# Patient Record
Sex: MALE | Race: WHITE
[De-identification: names, ages, dates, MRNs, and addresses within clinical notes are randomized per-mention and may not be internally consistent; named-entity substitution may affect disease eponyms.]

---

## 2018-07-11 ENCOUNTER — HOSPITAL ENCOUNTER (INPATIENT)
Dept: HOSPITAL 74 - YASAS | Age: 33
LOS: 3 days | Discharge: LEFT BEFORE DISCHARGE | DRG: 770 | End: 2018-07-14
Attending: SURGERY | Admitting: SURGERY
Payer: COMMERCIAL

## 2018-07-11 VITALS — BODY MASS INDEX: 24.7 KG/M2

## 2018-07-11 DIAGNOSIS — F10.230: ICD-10-CM

## 2018-07-11 DIAGNOSIS — F17.213: ICD-10-CM

## 2018-07-11 DIAGNOSIS — F14.20: ICD-10-CM

## 2018-07-11 DIAGNOSIS — F12.20: ICD-10-CM

## 2018-07-11 DIAGNOSIS — E11.9: ICD-10-CM

## 2018-07-11 DIAGNOSIS — F19.24: ICD-10-CM

## 2018-07-11 DIAGNOSIS — F32.9: ICD-10-CM

## 2018-07-11 DIAGNOSIS — F13.230: ICD-10-CM

## 2018-07-11 DIAGNOSIS — F11.20: Primary | ICD-10-CM

## 2018-07-11 DIAGNOSIS — Z79.84: ICD-10-CM

## 2018-07-11 PROCEDURE — HZ2ZZZZ DETOXIFICATION SERVICES FOR SUBSTANCE ABUSE TREATMENT: ICD-10-PCS | Performed by: SURGERY

## 2018-07-11 RX ADMIN — Medication SCH: at 23:41

## 2018-07-11 NOTE — HP
CIWA Score





- CIWA Score


Nausea/Vomitin-No Nausea/No Vomiting


Muscle Tremors: 1-None Visible, but Felt


Anxiety: 3


Agitation: 3


Paroxysmal Sweats: 2


Orientation: 0-Oriented


Tacttile Disturbances: 0-None


Auditory Disturbances: 0-None


Visual Disturbances: 0-None


Headache: 3-Moderate


CIWA-Ar Total Score: 12





Admission ROS S





- HPI


Chief Complaint: 





alcohol withdrawal symptoms 


Allergies/Adverse Reactions: 


 Allergies











Allergy/AdvReac Type Severity Reaction Status Date / Time


 


No Known Allergies Allergy   Unverified 18 20:44











History of Present Illness: 





32 yo male with hx of nicotine, marijuana, IV heroin, xanax, fentanyl, cocaine 

dependence, is here seeking detox for the first time. Reports no prior 

admissions any  detox facility. [facility] tx for facial fx and opioid 

overdoes on 18. Reports OD x3. MMTP: [facility], on methadone 30 mg

, last medicated today. PMHX: right hip replacement, DMII ( Oral meds), 

depression. Denies hx of seizures or blackouts. Longest period of sobriety 2 

years. 


. 


Exam Limitations: No Limitations





- Ebola screening


Have you been sick,other than usual withdrawal symptoms: No





- Review of Systems


Constitutional: Chills, Diaphoresis, Loss of Appetite, Changes in sleep (

reports no sleep x 7 days), Weakness, Unintentional Wgt. Loss (20 lbs over a 

month)


EENT: reports: Blurred Vision, Other (hx nose fx on 18)


Respiratory: reports: SOB with Exertion


Cardiac: reports: Lightheadedness


GI: reports: No Symptoms Reported


: reports: No Symptoms Reported


Musculoskeletal: reports: See HPI


Integumentary: reports: No Symptoms Reported


Neuro: reports: Headache, Weakness


Endocrine: reports: No Symptoms Reported


Hematology: reports: No Symptoms Reported


Psychiatric: reports: Orientated x3, Anxious


Other Systems: Reviewed and Negative





Patient History





- Patient Medical History


Hx Anemia: No


Hx Asthma: No


Hx Chronic Obstructive Pulmonary Disease (COPD): No


Hx Cardiac Disorders: No


Hx Congestive Heart Failure: No


Hx Hypertension: No


Hx Hypercholesterolemia: No


Hx Pacemaker: No


HX Cerebrovascular Accident: No


Hx Seizures: No


Hx Dementia: No


Hx Diabetes: Yes (on Metformin)


Hx Gastrointestinal Disorders: No


Hx Liver Disease: No


Hx Genitourinary Disorders: No


Hx Sexually Transmitted Disorders: No


Hx Renal Disease (ESRD): No


Hx Thyroid Disease: No


Hx Human Immunodeficiency Virus (HIV): No


Hx Hepatitis C: No


Hx Depression: Yes


Hx Suicide Attempt: No


Hx Bipolar Disorder: No


Hx Schizophrenia: No





- Patient Surgical History


Past Surgical History: Yes


Hx Neurologic Surgery: No


Hx Cataract Extraction: No


Hx Cardiac Surgery: No


Hx Lung Surgery: No


Hx Breast Surgery: No


Hx Breast Biopsy: No


Hx Abdominal Surgery: No


Hx Appendectomy: No


Hx Cholecystectomy: No


Hx Genitourinary Surgery: No


Hx Orthopedic Surgery: Yes (Right Hip Replacement )


Anesthesia Reaction: No





- PPD History


Previous Implant?: No


Documented Results: Negative w/o proof


PPD to be Administered?: Yes





- Smoking Cessation


Smoking history: Current every day smoker


Have you smoked in the past 12 months: Yes


Aproximately how many cigarettes per day: 4


Hx Chewing Tobacco Use: No


Initiated information on smoking cessation: Yes


'Breaking Loose' booklet given: 18





- Substance & Tx. History


Hx Alcohol Use: Yes


Substance Use Type: Alcohol, Cocaine, Heroin, Marijuana, Opiates, Tranquilizers


Hx Substance Use Treatment: No





- Substances Abused


  ** Heroin


Route: SNIFF


Frequency: Daily


Amount used: 9 BAGS


Age of first use: 29


Date of Last Use: 18





  ** Alprazolam (Xanax)


Route: Oral


Frequency: Daily


Amount used: 4/2MG


Age of first use: 27


Date of Last Use: 18





  ** Alcohol


Route: Oral


Frequency: Daily


Amount used: 2 pints E&J, Vodka


Age of first use: 18


Date of Last Use: 07/10/18





Family Disease History





- Family Disease History


Family Disease History: Other: Father (alive, heroin dep ), Mother (alive, 

heroin dep )





Admission Physical Exam [facility]





- Vital Signs


Vital Signs: 


 Vital Signs - 24 hr











  18





  17:24


 


Temperature 97.7 F


 


Pulse Rate 66


 


Respiratory 18





Rate 


 


Blood Pressure 133/91














- Physical


General Appearance: Yes: Disheveled, Mild Distress, Thin, Anxious


HEENTM: Yes: EOMI, Hearing grossly Normal, Normal ENT Inspection, Normocephalic

, Normal Voice, GENARO, Pharynx Normal, Tm's normal


Respiratory: Yes: Chest Non-Tender, Lungs Clear, Normal Breath Sounds, No 

Respiratory Distress, No Accessory Muscle Use


Neck: Yes: Within Normal Limits


Breast: Yes: Breast Exam Deferred


Cardiology: Yes: Regular Rhythm, Regular Rate


Abdominal: Yes: Normal Bowel Sounds, Non Tender, Flat, Soft


Genitourinary: Yes: Within Normal Limits


Back: Yes: Normal Inspection


Musculoskeletal: Yes: full range of Motion, Gait Steady, Pelvis Stable


Extremities: Yes: Normal Capillary Refill, Normal Inspection, Normal Range of 

Motion, Non-Tender


Neurological: Yes: CNs II-XII NML intact, Fully Oriented, Alert, Motor Strength 

5/5, Depressed Affect


Integumentary: Yes: Normal Color, Warm, Diaphoresis, Track Marks (bilateral 

forearms)


Lymphatic: Yes: Within Normal Limits





- Diagnostic


(1) Alcohol dependence with withdrawal


Current Visit: Yes   Status: Acute   





(2) IV drug user


Current Visit: Yes   Status: Chronic   





(3) Opioid dependence on agonist therapy


Current Visit: Yes   Status: Acute   Comment: on methadone 30 mg qd, dose 

pending verification    





(4) Cocaine dependence


Current Visit: Yes   Status: Acute   


Qualifiers: 


   Substance use status: uncomplicated   Qualified Code(s): F14.20 - Cocaine 

dependence, uncomplicated   





(5) Sedative, hypnotic or anxiolytic dependence, continuous


Current Visit: Yes   Status: Acute   





(6) Marijuana dependence


Current Visit: Yes   Status: Acute   





(7) Nicotine dependence


Current Visit: Yes   Status: Acute   


Qualifiers: 


   Nicotine product type: cigarettes   Substance use status: in withdrawal   

Qualified Code(s): F17.213 - Nicotine dependence, cigarettes, with withdrawal   





(8) Diabetes mellitus


Current Visit: Yes   Status: Chronic   


Qualifiers: 


   Diabetes mellitus type: type 2   Diabetes mellitus long term insulin use: 

without long term use   Diabetes mellitus complication status: without 

complication   Qualified Code(s): E11.9 - Type 2 diabetes mellitus without 

complications   





Cleared for Admission S





- Detox or Rehab


[facility] Level of Care: Medically Managed


Detox Regimen/Protocol: Librium





BHS Breath Alcohol Content


Breath Alcohol Content: 0





Urine Drug Screen





- Results


Drug Screen Negative: No


Urine Drug Screen Results: THC-Marijuana, DEE-Cocaine, OPI-Opiates, MTD-

Methadone

## 2018-07-12 LAB
ALBUMIN SERPL-MCNC: 3.1 G/DL (ref 3.4–5)
ALP SERPL-CCNC: 60 U/L (ref 45–117)
ALT SERPL-CCNC: 44 U/L (ref 12–78)
ANION GAP SERPL CALC-SCNC: 10 MMOL/L (ref 8–16)
APPEARANCE UR: CLEAR
AST SERPL-CCNC: 39 U/L (ref 15–37)
BILIRUB SERPL-MCNC: 0.2 MG/DL (ref 0.2–1)
BILIRUB UR STRIP.AUTO-MCNC: NEGATIVE MG/DL
BUN SERPL-MCNC: 12 MG/DL (ref 7–18)
CALCIUM SERPL-MCNC: 8.2 MG/DL (ref 8.5–10.1)
CHLORIDE SERPL-SCNC: 107 MMOL/L (ref 98–107)
CO2 SERPL-SCNC: 27 MMOL/L (ref 21–32)
COLOR UR: (no result)
CREAT SERPL-MCNC: 0.8 MG/DL (ref 0.7–1.3)
DEPRECATED RDW RBC AUTO: 12.8 % (ref 11.9–15.9)
GLUCOSE SERPL-MCNC: 80 MG/DL (ref 74–106)
HCT VFR BLD CALC: 37.1 % (ref 35.4–49)
HGB BLD-MCNC: 12.3 GM/DL (ref 11.7–16.9)
KETONES UR QL STRIP: NEGATIVE
LEUKOCYTE ESTERASE UR QL STRIP.AUTO: NEGATIVE
MCH RBC QN AUTO: 29.5 PG (ref 25.7–33.7)
MCHC RBC AUTO-ENTMCNC: 33.2 G/DL (ref 32–35.9)
MCV RBC: 89 FL (ref 80–96)
NITRITE UR QL STRIP: NEGATIVE
PH UR: 7 [PH] (ref 5–8)
PLATELET # BLD AUTO: 248 K/MM3 (ref 134–434)
PMV BLD: 8.3 FL (ref 7.5–11.1)
POTASSIUM SERPLBLD-SCNC: 4.1 MMOL/L (ref 3.5–5.1)
PROT SERPL-MCNC: 6.1 G/DL (ref 6.4–8.2)
PROT UR QL STRIP: NEGATIVE
PROT UR QL STRIP: NEGATIVE
RBC # BLD AUTO: 4.17 M/MM3 (ref 4–5.6)
SODIUM SERPL-SCNC: 144 MMOL/L (ref 136–145)
SP GR UR: 1.01 (ref 1–1.03)
UROBILINOGEN UR STRIP-MCNC: NEGATIVE MG/DL (ref 0.2–1)
WBC # BLD AUTO: 6.3 K/MM3 (ref 4–10)

## 2018-07-12 RX ADMIN — BACITRACIN ZINC SCH GM: 500 OINTMENT TOPICAL at 09:47

## 2018-07-12 RX ADMIN — METHADONE HYDROCHLORIDE SCH MG: 10 TABLET ORAL at 09:47

## 2018-07-12 RX ADMIN — Medication SCH TAB: at 09:47

## 2018-07-12 RX ADMIN — Medication SCH MG: at 22:39

## 2018-07-12 RX ADMIN — METFORMIN HYDROCHLORIDE SCH MG: 500 TABLET ORAL at 08:00

## 2018-07-12 RX ADMIN — NICOTINE SCH: 14 PATCH, EXTENDED RELEASE TRANSDERMAL at 09:49

## 2018-07-12 NOTE — CONSULT
[facility] Psychiatric Consult





- Data


Date of interview: 07/12/18


Admission source: [facility]


Identifying data: This is 33 years old male, single father of two, unemployed, 

homeless, on PA, with no psychiatric hospitalization history,  with a history 

of Alcohol, Xanax, Heroin, Cannabis and Nicotine dependence, is here reporting 

withdrawal symptoms and seeking for detox. Patient is seeking for detox for the 

first time. Reports no prior admissions to any  detox facility.


Substance Abuse History: Smoking history: Current every day smoker.  Have you 

smoked in the past 12 months: Yes.  Aproximately how many cigarettes per day: 

4.  Hx Chewing Tobacco Use: No.  Initiated information on smoking cessation: 

Yes.  'Breaking Loose' booklet given: 07/11/18.  - Substance & Tx. History.  Hx 

Alcohol Use: Yes.  Substance Use Type: Alcohol, Cocaine, Heroin, Marijuana, 

Opiates, Tranquilizers.  Hx Substance Use Treatment: No.  - Substances Abused.  

** Heroin.  Route: SNIFF.  Frequency: Daily.  Amount used: 9 BAGS.  Age of 

first use: 29.  Date of Last Use: 07/11/18.  ** Alprazolam (Xanax).  Route: 

Oral.  Frequency: Daily.  Amount used: 4/2MG.  Age of first use: 27.  Date of 

Last Use: 07/11/18.  ** Alcohol.  Route: Oral.  Frequency: Daily.  Amount used: 

2 pints E&J, Vodka.  Age of first use: 18.  Date of Last Use: 07/10/18


Medical History: DM, S/P Hip replacement, MMTP 30mg per day


Psychiatric History: Patient reports history of depression and anxiety, denies 

psychiatric hospitalization history, reports no medications taking prior to 

admission.


Physical/Sexual Abuse/Trauma History: Denies


Additional Comment: Observation.  Detox Unit Care Protocol





Mental Status Exam





- Mental Status Exam


Alert and Oriented to: Person


Cognitive Function: Fair


Patient Appearance: Unkempt


Mood: Sad


Affect: Flat


Patient Behavior: Sedated


Speech Pattern: Delayed


Voice Loudness: Mildly Soft/Quiet


Thought Process: Circumstantial


Thought Disorder: Being Controlled


Hallucinations: Denies


Suicidal Ideation: Denies


Homicidal Ideation: Denies


Insight/Judgement: Fair


Sleep: Difficulty falling asleep


Appetite: Fair


Muscle strength/Tone: Mild Hypotonicity


Gait/Station: Shuffling


Additional Comments: Observation.  Detox Unit Care Protocol





Psychiatric Findings





- Problem List (Axis 1, 2,3)


(1) Drug-induced mood disorder


Current Visit: Yes   Status: Suspected   





(2) Alcohol dependence with withdrawal


Current Visit: Yes   Status: Acute   





(3) Cocaine dependence


Current Visit: Yes   Status: Acute   


Qualifiers: 


   Substance use status: uncomplicated   Qualified Code(s): F14.20 - Cocaine 

dependence, uncomplicated   





(4) Marijuana dependence


Current Visit: Yes   Status: Acute   





(5) Nicotine dependence


Current Visit: Yes   Status: Acute   


Qualifiers: 


   Nicotine product type: cigarettes   Substance use status: in withdrawal   

Qualified Code(s): F17.213 - Nicotine dependence, cigarettes, with withdrawal   





(6) Opioid dependence on agonist therapy


Current Visit: Yes   Status: Acute   Comment: on methadone 30 mg qd, dose 

pending verification    





(7) Sedative, hypnotic or anxiolytic dependence, continuous


Current Visit: Yes   Status: Acute   





- Initial Treatment Plan


Initial Treatment Plan: Observation.  Detox Unit Care Protocol

## 2018-07-12 NOTE — PN
S CIWA





- CIWA Score


Nausea/Vomiting: 3


Muscle Tremors: 3


Anxiety: 2


Agitation: 2


Paroxysmal Sweats: 1-Minimal Palms Moist


Orientation: 0-Oriented


Tacttile Disturbances: 1-Very Mild Itch/Numbness


Auditory Disturbances: 1-Very Mild


Visual Disturbances: 0-None


Headache: 2-Mild


CIWA-Ar Total Score: 15





BHS Progress Note (SOAP)


Subjective: 





alert,irritable,anxious,interrupted sleep,tremor


Objective: 





07/12/18 09:04


 Vital Signs











Temperature  97.9 F   07/12/18 07:16


 


Pulse Rate  55 L  07/12/18 07:16


 


Respiratory Rate  16   07/12/18 07:16


 


Blood Pressure  119/79   07/12/18 07:16


 


O2 Sat by Pulse Oximetry (%)      








ekg sinus bradycardia 59/min


qt 390/386


no chest pain,no sob,no dizziness





07/12/18 09:06


 Laboratory Last Values











POC Glucometer  101 UNITS ()   07/12/18  05:24    








labs pending


Assessment: 





07/12/18 09:06


withdrawal symptom


Plan: 





continue detox

## 2018-07-12 NOTE — EKG
Test Reason : 

Blood Pressure : ***/*** mmHG

Vent. Rate : 059 BPM     Atrial Rate : 059 BPM

   P-R Int : 170 ms          QRS Dur : 086 ms

    QT Int : 390 ms       P-R-T Axes : 065 082 060 degrees

   QTc Int : 386 ms

 

SINUS BRADYCARDIA

POSSIBLE LEFT ATRIAL ENLARGEMENT

BORDERLINE ECG

NO PREVIOUS ECGS AVAILABLE

Confirmed by ELIANA BROWN, BOBBY ([id number]) on 7/12/2018 12:10:44 PM

 

Referred By:             Confirmed By:BOBBY MONROY MD

## 2018-07-13 RX ADMIN — Medication SCH TAB: at 10:09

## 2018-07-13 RX ADMIN — METFORMIN HYDROCHLORIDE SCH MG: 500 TABLET ORAL at 08:00

## 2018-07-13 RX ADMIN — BACITRACIN ZINC SCH GM: 500 OINTMENT TOPICAL at 10:09

## 2018-07-13 RX ADMIN — METHADONE HYDROCHLORIDE SCH MG: 10 TABLET ORAL at 05:36

## 2018-07-13 RX ADMIN — NICOTINE SCH: 14 PATCH, EXTENDED RELEASE TRANSDERMAL at 10:09

## 2018-07-13 RX ADMIN — Medication SCH: at 23:42

## 2018-07-13 NOTE — PN
S CIWA





- CIWA Score


Nausea/Vomiting: 3


Muscle Tremors: 3


Anxiety: 2


Agitation: 2


Paroxysmal Sweats: 1-Minimal Palms Moist


Orientation: 0-Oriented


Tacttile Disturbances: 1-Very Mild Itch/Numbness


Auditory Disturbances: 1-Very Mild


Visual Disturbances: 0-None


Headache: 2-Mild


CIWA-Ar Total Score: 15





BHS Progress Note (SOAP)


Subjective: 





alert,irritable,anxious,interrupted sleep,tremor


Objective: 





07/13/18 08:51


 Vital Signs











Temperature  97.7 F   07/13/18 07:24


 


Pulse Rate  64   07/13/18 07:24


 


Respiratory Rate  18   07/13/18 07:24


 


Blood Pressure  122/80   07/13/18 07:24


 


O2 Sat by Pulse Oximetry (%)      











07/13/18 08:51


withdrawal symptom


 Laboratory Last Values











WBC  6.3 K/mm3 (4.0-10.0)   07/12/18  07:00    


 


RBC  4.17 M/mm3 (4.00-5.60)   07/12/18  07:00    


 


Hgb  12.3 GM/dL (11.7-16.9)   07/12/18  07:00    


 


Hct  37.1 % (35.4-49)   07/12/18  07:00    


 


MCV  89.0 fl (80-96)   07/12/18  07:00    


 


MCH  29.5 pg (25.7-33.7)   07/12/18  07:00    


 


MCHC  33.2 g/dl (32.0-35.9)   07/12/18  07:00    


 


RDW  12.8 % (11.9-15.9)   07/12/18  07:00    


 


Plt Count  248 K/MM3 (134-434)   07/12/18  07:00    


 


MPV  8.3 fl (7.5-11.1)   07/12/18  07:00    


 


Sodium  144 mmol/L (136-145)   07/12/18  07:00    


 


Potassium  4.1 mmol/L (3.5-5.1)   07/12/18  07:00    


 


Chloride  107 mmol/L ()   07/12/18  07:00    


 


Carbon Dioxide  27 mmol/L (21-32)   07/12/18  07:00    


 


Anion Gap  10  (8-16)   07/12/18  07:00    


 


BUN  12 mg/dL (7-18)   07/12/18  07:00    


 


Creatinine  0.8 mg/dL (0.7-1.3)   07/12/18  07:00    


 


Creat Clearance w eGFR  > 60  (>60)   07/12/18  07:00    


 


POC Glucometer  103 UNITS ()   07/13/18  05:35    


 


Random Glucose  80 mg/dL ()   07/12/18  07:00    


 


Calcium  8.2 mg/dL (8.5-10.1)  L  07/12/18  07:00    


 


Total Bilirubin  0.2 mg/dL (0.2-1.0)   07/12/18  07:00    


 


AST  39 U/L (15-37)  H  07/12/18  07:00    


 


ALT  44 U/L (12-78)   07/12/18  07:00    


 


Alkaline Phosphatase  60 U/L ()   07/12/18  07:00    


 


Total Protein  6.1 g/dl (6.4-8.2)  L  07/12/18  07:00    


 


Albumin  3.1 g/dl (3.4-5.0)  L  07/12/18  07:00    


 


Urine Color  Straw   07/12/18  09:50    


 


Urine Appearance  Clear   07/12/18  09:50    


 


Urine pH  7.0  (5.0-8.0)   07/12/18  09:50    


 


Ur Specific Gravity  1.008  (1.001-1.035)   07/12/18  09:50    


 


Urine Protein  Negative  (NEGATIVE)   07/12/18  09:50    


 


Urine Glucose (UA)  Negative  (NEGATIVE)   07/12/18  09:50    


 


Urine Ketones  Negative  (NEGATIVE)   07/12/18  09:50    


 


Urine Blood  Negative  (NEGATIVE)   07/12/18  09:50    


 


Urine Nitrite  Negative  (NEGATIVE)   07/12/18  09:50    


 


Urine Bilirubin  Negative  (<2.0 mg/dL)   07/12/18  09:50    


 


Urine Urobilinogen  Negative mg/dL (0.2-1.0)   07/12/18  09:50    


 


Ur Leukocyte Esterase  Negative  (NEGATIVE)   07/12/18  09:50    


 


RPR Titer  Nonreactive  (NONREACTIVE)   07/12/18  07:00    


 


HIV 1&2 Antibody Screen  Negative   07/12/18  07:00    


 


HIV P24 Antigen  Negative   07/12/18  07:00    











07/13/18 08:52


bgm 103


Assessment: 





07/13/18 08:52


withdrawal symptom


Plan: 





continue detox

## 2018-07-14 VITALS — DIASTOLIC BLOOD PRESSURE: 66 MMHG | HEART RATE: 83 BPM | TEMPERATURE: 96.4 F | SYSTOLIC BLOOD PRESSURE: 128 MMHG

## 2018-07-14 RX ADMIN — NICOTINE SCH MG: 14 PATCH, EXTENDED RELEASE TRANSDERMAL at 10:12

## 2018-07-14 RX ADMIN — METFORMIN HYDROCHLORIDE SCH MG: 500 TABLET ORAL at 06:54

## 2018-07-14 RX ADMIN — Medication SCH TAB: at 10:10

## 2018-07-14 RX ADMIN — METHADONE HYDROCHLORIDE SCH MG: 10 TABLET ORAL at 06:03

## 2018-07-14 RX ADMIN — BACITRACIN ZINC SCH GM: 500 OINTMENT TOPICAL at 10:10

## 2018-07-14 NOTE — PN
[facility]HS Progress Note (SOAP)


Subjective: 


sleep disturbance


shakes


sweats








Objective: 





07/14/18 15:19


A & O x 3


Ambulating on unit in no distress


Restless


 Vital Signs











Temperature  96.4 F L  07/14/18 15:07


 


Pulse Rate  83   07/14/18 15:07


 


Respiratory Rate  18   07/14/18 15:07


 


Blood Pressure  128/66   07/14/18 15:07


 


O2 Sat by Pulse Oximetry (%)      











Assessment: 





07/14/18 15:19


withdrawal sx


Plan: 





continue detox

## 2018-07-14 NOTE — DS
[facility] Detox Discharge Summary


Admission Date: 


07/11/18





Discharge Date: 07/14/18





- History


Additional Comments: 





pt came up to nursing desk after provider had seen him to states that he wants 

to leave the unit.


Did not give reason, stating omly "i just want to leave". Not hostile nor 

agitated but declining advise for him to complete detox.


Appears pt will be leaving in the company of 3 other pts who 


also want to leave.


Pt A & O x 3, not in acute distress and does not show adverse withdrawal 

symptoms at this time.


Pt will leave AMA


<Metformin sent to [place] pharmacy to , pt made aware.


Pertinent Past History: 





DM 2


MMTP 





- Physical Exam Results


Vital Signs: 


 Vital Signs











Temperature  96.4 F L  07/14/18 15:07


 


Pulse Rate  83   07/14/18 15:07


 


Respiratory Rate  18   07/14/18 15:07


 


Blood Pressure  128/66   07/14/18 15:07


 


O2 Sat by Pulse Oximetry (%)      











Pertinent Admission Physical Exam Findings: 





withdrawal sx





- Medication


Discharge Medications: 


Ambulatory Orders





Metformin HCl [Glucophage] 500 mg PO DAILY 07/11/18 











- Diagnosis


(1) Alcohol dependence with withdrawal


Current Visit: Yes   Status: Acute   





(2) Cocaine dependence


Current Visit: Yes   Status: Chronic   


Qualifiers: 


   Substance use status: uncomplicated   Qualified Code(s): F14.20 - Cocaine 

dependence, uncomplicated   





(3) Marijuana dependence


Current Visit: Yes   Status: Chronic   





(4) Nicotine dependence


Current Visit: Yes   Status: Acute   


Qualifiers: 


   Nicotine product type: cigarettes   Substance use status: in withdrawal   

Qualified Code(s): F17.213 - Nicotine dependence, cigarettes, with withdrawal   





(5) Opioid dependence on agonist therapy


Current Visit: Yes   Status: Chronic   





(6) Sedative, hypnotic or anxiolytic dependence, continuous


Current Visit: Yes   Status: Acute   





(7) Diabetes mellitus


Current Visit: Yes   Status: Chronic   


Qualifiers: 


   Diabetes mellitus type: type 2   Diabetes mellitus long term insulin use: 

without long term use   Diabetes mellitus complication status: without 

complication   Qualified Code(s): E11.9 - Type 2 diabetes mellitus without 

complications   





(8) IV drug user


Current Visit: Yes   Status: Chronic   





(9) Drug-induced mood disorder


Current Visit: Yes   Status: Suspected   





- AMA


Did Patient Leave Against Medical Advice: Yes

## 2018-07-14 NOTE — PN
[facility] Progress Note


Note: 


Told by RN that pt had an unwitnessed fall.


At bedside to evaluate pt, pt A & O x 3, ambulates with a limp which is his 

usual since his R hip replacement in 2004.


No abrasions nor deformity noted.


Denies hitting head nor any other part but the R knee.


Decline cane offered


Decline ED visit for evaluation of unwitnessed fall